# Patient Record
Sex: FEMALE | Race: WHITE | NOT HISPANIC OR LATINO | URBAN - METROPOLITAN AREA
[De-identification: names, ages, dates, MRNs, and addresses within clinical notes are randomized per-mention and may not be internally consistent; named-entity substitution may affect disease eponyms.]

---

## 2019-06-11 ENCOUNTER — HOSPITAL ENCOUNTER (OUTPATIENT)
Dept: PHYSICAL THERAPY | Facility: HOSPITAL | Age: 49
Setting detail: THERAPIES SERIES
Discharge: HOME | End: 2019-06-11

## 2019-06-11 DIAGNOSIS — C71.9 GLIOBLASTOMA (HCC): ICD-10-CM

## 2019-06-11 DIAGNOSIS — Z74.09 IMPAIRED FUNCTIONAL MOBILITY, BALANCE, GAIT, AND ENDURANCE: Primary | ICD-10-CM

## 2019-06-11 PROCEDURE — 97162 PT EVAL MOD COMPLEX 30 MIN: CPT

## 2019-06-12 ENCOUNTER — TRANSCRIBE ORDERS (OUTPATIENT)
Dept: PHYSICAL THERAPY | Facility: HOSPITAL | Age: 49
End: 2019-06-12

## 2019-06-12 DIAGNOSIS — C71.9 GLIOBLASTOMA (HCC): Primary | ICD-10-CM

## 2019-06-12 NOTE — THERAPY TREATMENT NOTE
.Outpatient Physical Therapy Neuro Initial Evaluation  [facility]     Patient Name: Flower Mendiola  : 1970  MRN: [id number]  Today's Date: 2019      Visit Date: 2019    There is no problem list on file for this patient.       No past medical history on file.     No past surgical history on file.      Visit Dx:     ICD-10-CM ICD-9-CM   1. Impaired functional mobility, balance, gait, and endurance Z74.09 V49.89   2. Glioblastoma (CMS/Formerly [facility]) C71.9 191.9       Patient History     Row Name 19 1500             History    Chief Complaint  Balance Problems  -KL (r) NS (t) KL (c)      Brief Description of Current Complaint  Patient states that she has GBS that has been affecting her balance following getting diagnosed in October. She tried to join a trial at [facility] but did not qualify. She has had a craniotomy, chemo, and radiation. She is currently receiving chemotherapy every other week. She states that walking takes more effort to lift both feet. She isn't sure if it is related to the tumor or her medications. She also states that she catches her feet while walking, as well as feeling unsteady when standing for awhile. She states that she has almost fallen a few times. She also states that she has blurred vision in both eyes.   -KL (r) NS (t) KL (c)      Patient/Caregiver Goals  Improve mobility;Improve strength  -KL (r) NS (t) KL (c)      Current Tobacco Use  recently quit   -KL (r) NS (t) KL (c)      Hand Dominance  right-handed  -KL (r) NS (t) KL (c)      Occupation/sports/leisure activities  Reading, knitting  -KL (r) NS (t) KL (c)         Pain     Pain Location  Head  -KL (r) NS (t) KL (c)      Pain at Present  0  -KL (r) NS (t) KL (c)      Pain at Best  0  -KL (r) NS (t) KL (c)      Pain at Worst  6  -KL (r) NS (t) KL (c)      Pain Description  Numbness;Tingling;Headache  -KL (r) NS (t) KL (c)      Pain Comments  Numbness and tingling into extremities from chemo  -KL (r) NS (t) KL (c)       Difficulties with ADL's?  Some difficulties getting in and out of the tub.  -KL (r) NS (t) KL (c)         Fall Risk Assessment    Any falls in the past year:  No  -KL (r) NS (t) KL (c)         Daily Activities    Primary Language  English  -KL (r) NS (t) KL (c)      Are you able to read  Yes  -KL (r) NS (t) KL (c)      Are you able to write  Yes  -KL (r) NS (t) KL (c)        User Key  (r) = Recorded By, (t) = Taken By, (c) = Cosigned By    Initials Name Provider Type    Rica Mckee, PT Physical Therapist    NS Ramandeep Baez, PT Student PT Student              PT Neuro     Row Name 06/11/19 1500             Home Living    Home Accessibility  stairs within home  -KL (r) NS (t) KL (c)         Vision-Basic Assessment    Current Vision  Other (Comment) Patient has blurred vision bilaterally  -KL (r) NS (t) KL (c)         Cognition    Overall Cognitive Status  Impaired  -KL (r) NS (t) KL (c)      Memory  Decreased short term memory  -KL (r) NS (t) KL (c)         Sensation    Light Touch  Partial deficits in the RUE;Partial deficits in the LUE;Partial deficits in the RLE;Partial deficits in the LLE  -KL (r) NS (t) KL (c)         MMT (Manual Muscle Testing)    Rt Lower Ext  Rt Hip Flexion;Rt Hip Extension;Rt Hip ABduction;Rt Knee Extension;Rt Knee Flexion;Rt Ankle Dorsiflexion  -KL (r) NS (t) KL (c)      Lt Lower Ext  Lt Hip Flexion;Lt Hip Extension;Lt Hip ABduction;Lt Knee Extension;Lt Knee Flexion;Lt Ankle Dorsiflexion  -KL (r) NS (t) KL (c)         MMT Right Lower Ext    Rt Hip Flexion MMT, Gross Movement  (4+/5) good plus  -KL (r) NS (t) KL (c)      Rt Hip Extension MMT, Gross Movement  (4-/5) good minus  -KL (r) NS (t) KL (c)      Rt Hip ABduction MMT, Gross Movement  (4/5) good  -KL (r) NS (t) KL (c)      Rt Knee Extension MMT, Gross Movement  (5/5) normal  -KL (r) NS (t) KL (c)      Rt Knee Flexion MMT, Gross Movement  (5/5) normal  -KL (r) NS (t) KL (c)      Rt Ankle Dorsiflexion MMT, Gross Movement   (4/5) good  -KL (r) NS (t) KL (c)         MMT Left Lower Ext    Lt Hip Flexion MMT, Gross Movement  (4+/5) good plus  -KL (r) NS (t) KL (c)      Lt Hip Extension MMT, Gross Movement  (4-/5) good minus  -KL (r) NS (t) KL (c)      Lt Hip ABduction MMT, Gross Movement  (4/5) good  -KL (r) NS (t) KL (c)      Lt Knee Extension MMT, Gross Movement  (5/5) normal  -KL (r) NS (t) KL (c)      Lt Knee Flexion MMT, Gross Movement  (5/5) normal  -KL (r) NS (t) KL (c)      Lt Ankle Dorsiflexion MMT, Gross Movement  (4+/5) good plus  -KL (r) NS (t) KL (c)         Gait/Stairs Assessment/Training    Comment (Gait/Stairs)  Patient demonstrates increased SHANDA with decreased arm swing  -KL (r) NS (t) KL (c)        User Key  (r) = Recorded By, (t) = Taken By, (c) = Cosigned By    Initials Name Provider Type    KL Rica Mason, PT Physical Therapist    Ramandeep Ramirez, PT Student PT Student                  Therapy Education  Education Details: Patient was educated about purpose of PT to address balance deficits. Will provide HEP upon next visit.  How Provided: Verbal  Provided to: Patient  Level of Understanding: Verbalized    PT OP Goals     Row Name 06/11/19 1500          PT Short Term Goals    STG Date to Achieve  07/10/19  -KL (r) NS (t) KL (c)     STG 1  Patient will be compliant with initial HEP  -KL (r) NS (t) KL (c)     STG 1 Progress  New  -KL (r) NS (t) KL (c)     STG 2  Patient will improve TUG score to </= 6 seconds to decrease risk of falls  -KL (r) NS (t) KL (c)     STG 2 Progress  New  -KL (r) NS (t) KL (c)     STG 3  Patient will improved 10m walk speed to </= 8 seconds  -KL (r) NS (t) KL (c)     STG 3 Progress  New  -KL (r) NS (t) KL (c)        Long Term Goals    LTG Date to Achieve  08/07/19  -KL (r) NS (t) KL (c)     LTG 1  Patient will be compliant with final HEP  -KL (r) NS (t) KL (c)     LTG 1 Progress  New  -KL (r) NS (t) KL (c)     LTG 2  Patient will score >/= 25/28 on the Mini Best to improve functional  mobility at home and in the community  -KL (r) NS (t) KL (c)     LTG 2 Progress  New  -KL (r) NS (t) KL (c)     LTG 3  Patient will score >/= 26/30 on the FGA to improve gait dynamics and decrease risk of falls  -KL (r) NS (t) KL (c)     LTG 3 Progress  New  -KL (r) NS (t) KL (c)        Time Calculation    PT Goal Re-Cert Due Date  09/09/19  -KL (r) NS (t) KL (c)       User Key  (r) = Recorded By, (t) = Taken By, (c) = Cosigned By    Initials Name Provider Type    KL Rica Mason, PT Physical Therapist    Ramandeep Ramirez, PT Student PT Student          PT Assessment/Plan     Row Name 06/12/19 0751 06/11/19 1500       PT Assessment    Functional Limitations  --  -KL (r) NS (t) KL (c)  Decreased safety during functional activities;Impaired gait;Limitation in home management;Limitations in community activities;Performance in self-care ADL;Performance in leisure activities  -KL (r) NS (t) KL (c)    Impairments  --  -KL (r) NS (t) KL (c)  Balance;Cognition;Gait;Coordination;Endurance;Muscle strength;Pain  -KL (r) NS (t) KL (c)    Assessment Comments  --  -KL (r) NS (t) KL (c)  Patient is a 48 year old female with a GBS diagnosed in October. She demonstrates deficits in balance, strength, gait mechanics, and endurance, which limit in work and leisure activities. She will benefit from skilled PT to address these deficits, decrease risk of falls, and improve functional mobility and home and in the community.  -KL (r) NS (t) KL (c)    Please refer to paper survey for additional self-reported information  --  -KL (r) NS (t) KL (c)  Yes  -KL (r) NS (t) KL (c)    Rehab Potential  --  -KL (r) NS (t) KL (c)  Good  -KL (r) NS (t) KL (c)    Patient/caregiver participated in establishment of treatment plan and goals  --  -KL (r) NS (t) KL (c)  Yes  -KL (r) NS (t) KL (c)    Patient would benefit from skilled therapy intervention  --  -DEMETRIUS (sandra) NS (t) DEMETRIUS (c)  Yes  -DEMETRIUS (r) NS (t) DEMETRIUS (c)       PT Plan    PT Frequency  --  -DEMETRIUS Carrerar)  NS (tiburcio) DEMETRIUS goldman)  1x/week  -DEMETRIUS (r) NS (tiburcio) DEMETRIUS (coni)    Predicted Duration of Therapy Intervention (Therapy Eval)  --  -DEMETRIUS (r) NS (tiburcio) DEMETRIUS (coni)  8 visits  -DEMETRIUS lui) NS (tiburcio) DEMETRIUS goldman)    Planned CPT's?  --  -DEMETRIUS (sandra) NS (tiburcio) DEMETRIUS (coni)  PT EVAL MOD COMPLELITY: 15247;PT THER PROC EA 15 MIN: 46133;PT THER ACT EA 15 MIN: 05405;PT MANUAL THERAPY EA 15 MIN: 11941;PT NEUROMUSC RE-EDUCATION EA 15 MIN: 62095;PT GAIT TRAINING EA 15 MIN: 63121;PT HOT/COLD PACK WC NONMCARE: 73649  -DEMETRIUS (sandra) NS (tiburcio) DEMETRIUS (coni)    PT Plan Comments  --  -DEMETRIUS (sandra) NS (tiburcio) DEMETRIUS (coni)  Patient will be seen 1x/week for 8 weeks with interventions to include strengthening, stretching, neuromuscular re-education, gait training, and modalities as indicated.  -DEMETRIUS (sandra) NS (tiburcio) DEMETRIUS (coni)      User Key  (r) = Recorded By, (t) = Taken By, (c) = Cosigned By    Initials Name Provider Type    Rica Mckee, PT Physical Therapist    Ramandeep Ramirez, PT Student PT Student                             Outcome Measure Options: 10 Meter Walk, Timed Up and Go (TUG), FGA (Functional Gait Assessment), Mini-Best Test  10 Meter Walk Test Self-Selected Velocity  Self-Selected Velocity: Trial 1: 10.16 sec.  10 Meter Walk Test Fast Velocity  10 Meter Walk Fast Velocity: Trial 1: 7.41 sec.  Functional Gait Assessment (FGA)  Gait Level Surface: Mild Impairment  Change in Gait Speed: Mild Impairment  Gait with Horizontal Head Turns: Mild Impairment  Gait with Vertical Head Turns: Mild Impairment  Gait and Pivot Turn: Mild Impairment  Step Over Obstacle: Mild Impairment  Gait with Narrow Base of Support: Normal  Gait with Eyes Closed: Mild Impairment  Ambulating Backwards: Mild Impairment  Steps: Moderate Impairment  FGA Total Score: 20  Mini Best  Mini Best Score/Comments: 19/28  Timed Up and Go (TUG)  TUG Test 1: 7.78 seconds  TUG Test 2: 8.43 seconds(cognitive)  Timed Up and Go Comments: 6.62(manual)    Time Calculation:       Therapy Charges for Today     Code Description Service Date Service Provider  Modifiers Qty    [id number] HC PT EVAL MOD COMPLEXITY 4 6/11/2019 Ramandeep Baez, PT Student GP 1          PT G-Codes  Outcome Measure Options: 10 Meter Walk, Timed Up and Go (TUG), FGA (Functional Gait Assessment), Mini-Best Test  FGA Total Score: 20  TUG Test 1: 7.78 seconds  TUG Test 2: 8.43 seconds(cognitive)         Ramandeep Baez, PT Student  6/12/2019

## 2019-06-19 ENCOUNTER — HOSPITAL ENCOUNTER (OUTPATIENT)
Dept: PHYSICAL THERAPY | Facility: HOSPITAL | Age: 49
Setting detail: THERAPIES SERIES
Discharge: HOME | End: 2019-06-19

## 2019-06-19 DIAGNOSIS — C71.9 GLIOBLASTOMA (HCC): Primary | ICD-10-CM

## 2019-06-19 DIAGNOSIS — Z74.09 IMPAIRED FUNCTIONAL MOBILITY, BALANCE, GAIT, AND ENDURANCE: ICD-10-CM

## 2019-06-19 PROCEDURE — 97110 THERAPEUTIC EXERCISES: CPT

## 2019-06-19 PROCEDURE — 97112 NEUROMUSCULAR REEDUCATION: CPT
